# Patient Record
Sex: FEMALE | Race: WHITE
[De-identification: names, ages, dates, MRNs, and addresses within clinical notes are randomized per-mention and may not be internally consistent; named-entity substitution may affect disease eponyms.]

---

## 2017-10-06 ENCOUNTER — HOSPITAL ENCOUNTER (EMERGENCY)
Dept: HOSPITAL 9 - MADERS | Age: 32
Discharge: HOME | End: 2017-10-06
Payer: MEDICAID

## 2017-10-06 DIAGNOSIS — F17.210: ICD-10-CM

## 2017-10-06 DIAGNOSIS — N39.0: Primary | ICD-10-CM

## 2017-10-06 LAB
BACTERIA UR QL AUTO: (no result) HPF
PREGU CONTROL BACKGROUND?: (no result)
PREGU CONTROL BAR APPEAR?: YES
SP GR UR STRIP: 1.02 (ref 1–1.03)

## 2017-10-06 PROCEDURE — 87186 SC STD MICRODIL/AGAR DIL: CPT

## 2017-10-06 PROCEDURE — 87086 URINE CULTURE/COLONY COUNT: CPT

## 2017-10-06 PROCEDURE — 87077 CULTURE AEROBIC IDENTIFY: CPT

## 2017-10-06 PROCEDURE — 99283 EMERGENCY DEPT VISIT LOW MDM: CPT

## 2017-10-06 PROCEDURE — 81025 URINE PREGNANCY TEST: CPT

## 2017-10-06 PROCEDURE — 81001 URINALYSIS AUTO W/SCOPE: CPT

## 2018-05-08 ENCOUNTER — HOSPITAL ENCOUNTER (EMERGENCY)
Dept: HOSPITAL 92 - ERS | Age: 33
Discharge: HOME | End: 2018-05-08
Payer: MEDICAID

## 2018-05-08 DIAGNOSIS — A08.4: Primary | ICD-10-CM

## 2018-05-08 DIAGNOSIS — F17.210: ICD-10-CM

## 2018-05-08 DIAGNOSIS — F41.9: ICD-10-CM

## 2018-05-08 LAB
ALBUMIN SERPL BCG-MCNC: 4.2 G/DL (ref 3.5–5)
ALP SERPL-CCNC: 68 U/L (ref 40–150)
ALT SERPL W P-5'-P-CCNC: 14 U/L (ref 8–55)
ANION GAP SERPL CALC-SCNC: 11 MMOL/L (ref 10–20)
AST SERPL-CCNC: 16 U/L (ref 5–34)
BASOPHILS # BLD AUTO: 0.1 THOU/UL (ref 0–0.2)
BASOPHILS NFR BLD AUTO: 0.7 % (ref 0–1)
BILIRUB SERPL-MCNC: 0.9 MG/DL (ref 0.2–1.2)
BUN SERPL-MCNC: 12 MG/DL (ref 7–18.7)
CALCIUM SERPL-MCNC: 9 MG/DL (ref 7.8–10.44)
CHLORIDE SERPL-SCNC: 102 MMOL/L (ref 98–107)
CO2 SERPL-SCNC: 26 MMOL/L (ref 22–29)
CREAT CL PREDICTED SERPL C-G-VRATE: 0 ML/MIN (ref 70–130)
EOSINOPHIL # BLD AUTO: 0 THOU/UL (ref 0–0.7)
EOSINOPHIL NFR BLD AUTO: 0.5 % (ref 0–10)
GLOBULIN SER CALC-MCNC: 3.5 G/DL (ref 2.4–3.5)
GLUCOSE SERPL-MCNC: 92 MG/DL (ref 70–105)
HGB BLD-MCNC: 14.2 G/DL (ref 12–16)
LIPASE SERPL-CCNC: 8 U/L (ref 8–78)
LYMPHOCYTES # BLD: 2.5 THOU/UL (ref 1.2–3.4)
LYMPHOCYTES NFR BLD AUTO: 28.4 % (ref 21–51)
MCH RBC QN AUTO: 34.9 PG (ref 27–31)
MCV RBC AUTO: 101 FL (ref 81–99)
MONOCYTES # BLD AUTO: 0.5 THOU/UL (ref 0.11–0.59)
MONOCYTES NFR BLD AUTO: 5.4 % (ref 0–10)
NEUTROPHILS # BLD AUTO: 5.6 THOU/UL (ref 1.4–6.5)
NEUTROPHILS NFR BLD AUTO: 65.1 % (ref 42–75)
PLATELET # BLD AUTO: 190 THOU/UL (ref 130–400)
POTASSIUM SERPL-SCNC: 3.7 MMOL/L (ref 3.5–5.1)
PREGU CONTROL BACKGROUND?: (no result)
PREGU CONTROL BAR APPEAR?: YES
RBC # BLD AUTO: 4.05 MILL/UL (ref 4.2–5.4)
SODIUM SERPL-SCNC: 135 MMOL/L (ref 136–145)
SP GR UR STRIP: 1.01 (ref 1–1.04)
WBC # BLD AUTO: 8.6 THOU/UL (ref 4.8–10.8)

## 2018-05-08 PROCEDURE — 96372 THER/PROPH/DIAG INJ SC/IM: CPT

## 2018-05-08 PROCEDURE — 96360 HYDRATION IV INFUSION INIT: CPT

## 2018-05-08 PROCEDURE — 81003 URINALYSIS AUTO W/O SCOPE: CPT

## 2018-05-08 PROCEDURE — 87660 TRICHOMONAS VAGIN DIR PROBE: CPT

## 2018-05-08 PROCEDURE — 80053 COMPREHEN METABOLIC PANEL: CPT

## 2018-05-08 PROCEDURE — 81025 URINE PREGNANCY TEST: CPT

## 2018-05-08 PROCEDURE — 83690 ASSAY OF LIPASE: CPT

## 2018-05-08 PROCEDURE — 99406 BEHAV CHNG SMOKING 3-10 MIN: CPT

## 2018-05-08 PROCEDURE — 85025 COMPLETE CBC W/AUTO DIFF WBC: CPT

## 2018-05-08 PROCEDURE — 87491 CHLMYD TRACH DNA AMP PROBE: CPT

## 2018-05-08 PROCEDURE — 36415 COLL VENOUS BLD VENIPUNCTURE: CPT

## 2018-05-08 PROCEDURE — 87480 CANDIDA DNA DIR PROBE: CPT

## 2018-05-08 PROCEDURE — 87510 GARDNER VAG DNA DIR PROBE: CPT

## 2018-05-08 PROCEDURE — 87591 N.GONORRHOEAE DNA AMP PROB: CPT

## 2019-04-24 ENCOUNTER — HOSPITAL ENCOUNTER (OUTPATIENT)
Dept: HOSPITAL 92 - BICMAMMO | Age: 34
Discharge: HOME | End: 2019-04-24
Payer: MEDICAID

## 2019-04-24 DIAGNOSIS — Z80.3: ICD-10-CM

## 2019-04-24 DIAGNOSIS — N64.4: Primary | ICD-10-CM

## 2019-04-24 DIAGNOSIS — N63.11: ICD-10-CM

## 2019-04-24 PROCEDURE — G0279 TOMOSYNTHESIS, MAMMO: HCPCS

## 2019-04-24 PROCEDURE — 77066 DX MAMMO INCL CAD BI: CPT

## 2019-04-24 NOTE — ULT
LIMITED RIGHT BREAST ULTRASOUND

LIMITED LEFT BREAST ULTRASOUND:

 

History: Palpable abnormalities in the breast bilaterally. 

 

FINDINGS: 

Sonographic evaluation of the region of palpable concern at the 10 o'clock position of the right donita
st and the 12 o'clock position of the left breast demonstrates no abnormality. 

 

IMPRESSION: 

BIRADS category 2 - benign findings. Return to appropriate mammographic screening based on risk facto
rs. 

 

POS: OFF

## 2019-04-24 NOTE — MMO
Bilateral MAMMO Bilat Diag [facility].

 

CLINICAL HISTORY:

Patient is 33 years old and is seen for diagnostic exam and palpable abnormality

in both breasts. The patient has the following family history of breast cancer:

maternal grandmother.  The patient has no personal history of cancer.

 

VIEWS:

The views performed were:  bilateral craniocaudal with tomosynthesis; bilateral

mediolateral oblique with tomosynthesis; and bilateral mediolateral.

 

FILMS COMPARED:

The present examination has been compared to a prior imaging study performed at

[facility] on 04/24/2019.

 

MAMMOGRAM FINDINGS:

There are scattered fibroglandular densities.

 

There are no suspicious masses, calcifications or areas of architectural

distortion.  No mammographic or sonograhic abnormality is seen at the site of

palpable concern in the either breast.

 

There are no suspicious masses, suspicious calcifications, or new areas of

architectural distortion.

 

IMPRESSION:

THERE IS NO MAMMOGRAPHIC EVIDENCE OF MALIGNANCY.

 

AGE APPROPRIATE SCREENING BASED ON RISK FACTORS IS RECOMMENDED.

 

THE RESULTS OF THIS EXAM WERE SENT TO THE PATIENT.

 

ACR BI-RADS Category 2 - Benign finding

 

MAMMOGRAPHY NOTE:

 1. A negative mammogram report should not delay a biopsy if a dominant of

 clinically suspicious mass is present.

 2. Approximately 10% to 15% of breast cancers are not detected by

 mammography.

 3. Adenosis and dense breasts may obscure an underlying neoplasm.

## 2019-08-05 ENCOUNTER — HOSPITAL ENCOUNTER (EMERGENCY)
Dept: HOSPITAL 9 - MADERS | Age: 34
Discharge: HOME | End: 2019-08-05
Payer: MEDICAID

## 2019-08-05 DIAGNOSIS — F41.9: ICD-10-CM

## 2019-08-05 DIAGNOSIS — F17.210: ICD-10-CM

## 2019-08-05 DIAGNOSIS — N30.90: Primary | ICD-10-CM

## 2019-08-05 DIAGNOSIS — N83.202: ICD-10-CM

## 2019-08-05 DIAGNOSIS — R11.0: ICD-10-CM

## 2019-08-05 LAB
BACTERIA UR QL AUTO: (no result) HPF
PREGU CONTROL BACKGROUND?: (no result)
PREGU CONTROL BAR APPEAR?: YES
RBC UR QL AUTO: (no result) HPF (ref 0–3)
WBC UR QL AUTO: (no result) HPF (ref 0–3)

## 2019-08-05 PROCEDURE — 96372 THER/PROPH/DIAG INJ SC/IM: CPT

## 2019-08-05 PROCEDURE — 87591 N.GONORRHOEAE DNA AMP PROB: CPT

## 2019-08-05 PROCEDURE — 81025 URINE PREGNANCY TEST: CPT

## 2019-08-05 PROCEDURE — 81015 MICROSCOPIC EXAM OF URINE: CPT

## 2019-08-05 PROCEDURE — 87480 CANDIDA DNA DIR PROBE: CPT

## 2019-08-05 PROCEDURE — 76856 US EXAM PELVIC COMPLETE: CPT

## 2019-08-05 PROCEDURE — 87510 GARDNER VAG DNA DIR PROBE: CPT

## 2019-08-05 PROCEDURE — 87491 CHLMYD TRACH DNA AMP PROBE: CPT

## 2019-08-05 PROCEDURE — 87186 SC STD MICRODIL/AGAR DIL: CPT

## 2019-08-05 PROCEDURE — 81003 URINALYSIS AUTO W/O SCOPE: CPT

## 2019-08-05 PROCEDURE — 87086 URINE CULTURE/COLONY COUNT: CPT

## 2019-08-05 PROCEDURE — 87077 CULTURE AEROBIC IDENTIFY: CPT

## 2019-08-05 PROCEDURE — 87660 TRICHOMONAS VAGIN DIR PROBE: CPT

## 2019-08-05 NOTE — ULT
Exam: Transabdominal and endovaginal pelvic ultrasound



HISTORY:Right adnexal tenderness.



COMPARISON: 2/27/2013



TECHNIQUE: Transabdominal and endovaginal imaging of the pelvis is performed. Ovaries are interrogate
d with grayscale, color flow, Doppler imaging and spectral wave form analysis



FINDINGS:

Uterus: No myometrial masses.



Uterus measuring: 10.0 x 4.2 x 5.6 cm.

Endometrium: Homogeneous echotexture.

Endometrium diameter: 0.5 cm.

.

Free fluid: None

Left ovary: Normal echotexture. Multi septated anechoic focus, likely representing a 1.4 x 1.2 cm cys
t

Left ovary measurements: 2.1 x 1.3 x 2.6 cm

Right ovary: Normal echotexture; follicles are noted.

Right ovary measurement: 2.5 x 1.7 x 1.6 cm

Ovarian Doppler:

There is vascular flow to the left and right ovary.



IMPRESSION: 

1. Unremarkable uterus

2. Multiseptated left ovarian cyst.

3. Unremarkable right adnexa.



Reported By: Lotus Jordan 

Electronically Signed:  8/5/2019 11:04 AM

## 2020-07-11 NOTE — CT
CT ABDOMEN AND PELVIS:

 

Date:  07/10/2020

 

COMPARISON:  

None. 

 

HISTORY:  

Diarrhea, nausea, flank pain, and suprapubic pain. 

 

TECHNIQUE:  

Axial CT imaging at 5 mm intervals through the abdomen and pelvis with IV and oral contrast. Coronal 
and sagittal reformatted imaging obtained. 

 

FINDINGS:

The imaged lung bases are unremarkable. No free intraperitoneal air.

 

The liver, gallbladder, spleen, pancreas, adrenal glands, and kidneys demonstrate no acute findings. 


 

There is no significant free pelvic fluid. 

 

There is no focal area of bowel inflammatory change. No evidence for bowel obstruction is noted. 

 

The appendix is not discretely visualized, but no right lower quadrant inflammatory change is seen to
 suggest the presence of acute appendicitis. 

 

The vascular structures of the abdomen/pelvis appear patent. No abdominal or pelvic lymphadenopathy i
s noted. No acute osseous abnormality. 

 

IMPRESSION: 

No acute findings.  

 

POS: [facility]

## 2021-05-23 ENCOUNTER — HOSPITAL ENCOUNTER (EMERGENCY)
Dept: HOSPITAL 9 - MADERS | Age: 36
Discharge: HOME | End: 2021-05-23
Payer: MEDICAID

## 2021-05-23 DIAGNOSIS — U07.1: Primary | ICD-10-CM

## 2021-05-23 DIAGNOSIS — J02.0: ICD-10-CM

## 2021-05-23 DIAGNOSIS — E86.0: ICD-10-CM

## 2021-05-23 DIAGNOSIS — R11.2: ICD-10-CM

## 2021-05-23 DIAGNOSIS — F17.210: ICD-10-CM

## 2021-05-23 LAB
ALBUMIN SERPL BCG-MCNC: 4.2 G/DL (ref 3.5–5)
ALP SERPL-CCNC: 67 U/L (ref 40–110)
ALT SERPL W P-5'-P-CCNC: 13 U/L (ref 8–55)
ANION GAP SERPL CALC-SCNC: 16 MMOL/L (ref 10–20)
AST SERPL-CCNC: 10 U/L (ref 5–34)
BILIRUB SERPL-MCNC: 0.7 MG/DL (ref 0.2–1.2)
BUN SERPL-MCNC: 9 MG/DL (ref 7–18.7)
CALCIUM SERPL-MCNC: 9 MG/DL (ref 7.8–10.44)
CHLORIDE SERPL-SCNC: 102 MMOL/L (ref 98–107)
CO2 SERPL-SCNC: 26 MMOL/L (ref 22–29)
CREAT CL PREDICTED SERPL C-G-VRATE: 0 ML/MIN (ref 70–130)
GLOBULIN SER CALC-MCNC: 3.5 G/DL (ref 2.4–3.5)
GLUCOSE SERPL-MCNC: 92 MG/DL (ref 70–105)
HGB BLD-MCNC: 13.9 G/DL (ref 12–16)
MCH RBC QN AUTO: 32.9 PG (ref 27–31)
MCV RBC AUTO: 98.5 FL (ref 78–98)
MDIFF COMPLETE?: YES
PLATELET # BLD AUTO: 245 THOU/UL (ref 130–400)
POTASSIUM SERPL-SCNC: 3.9 MMOL/L (ref 3.5–5.1)
RBC # BLD AUTO: 4.22 MILL/UL (ref 4.2–5.4)
SODIUM SERPL-SCNC: 140 MMOL/L (ref 136–145)
WBC # BLD AUTO: 14.3 THOU/UL (ref 4.8–10.8)

## 2021-05-23 PROCEDURE — 85025 COMPLETE CBC W/AUTO DIFF WBC: CPT

## 2021-05-23 PROCEDURE — 96365 THER/PROPH/DIAG IV INF INIT: CPT

## 2021-05-23 PROCEDURE — U0005 INFEC AGEN DETEC AMPLI PROBE: HCPCS

## 2021-05-23 PROCEDURE — 96375 TX/PRO/DX INJ NEW DRUG ADDON: CPT

## 2021-05-23 PROCEDURE — 87430 STREP A AG IA: CPT

## 2021-05-23 PROCEDURE — U0003 INFECTIOUS AGENT DETECTION BY NUCLEIC ACID (DNA OR RNA); SEVERE ACUTE RESPIRATORY SYNDROME CORONAVIRUS 2 (SARS-COV-2) (CORONAVIRUS DISEASE [COVID-19]), AMPLIFIED PROBE TECHNIQUE, MAKING USE OF HIGH THROUGHPUT TECHNOLOGIES AS DESCRIBED BY CMS-2020-01-R: HCPCS

## 2021-05-23 PROCEDURE — 80053 COMPREHEN METABOLIC PANEL: CPT

## 2021-05-24 LAB — SARS-COV-2 RNA RESP QL NAA+PROBE: DETECTED

## 2021-10-15 ENCOUNTER — HOSPITAL ENCOUNTER (EMERGENCY)
Dept: HOSPITAL 9 - MADERS | Age: 36
Discharge: HOME | End: 2021-10-15
Payer: MEDICAID

## 2021-10-15 DIAGNOSIS — J06.9: Primary | ICD-10-CM

## 2021-10-15 DIAGNOSIS — Z20.822: ICD-10-CM

## 2021-10-15 DIAGNOSIS — F17.210: ICD-10-CM

## 2021-10-15 PROCEDURE — U0005 INFEC AGEN DETEC AMPLI PROBE: HCPCS

## 2021-10-15 PROCEDURE — 99283 EMERGENCY DEPT VISIT LOW MDM: CPT

## 2021-10-15 PROCEDURE — 87081 CULTURE SCREEN ONLY: CPT

## 2021-10-15 PROCEDURE — 96372 THER/PROPH/DIAG INJ SC/IM: CPT

## 2021-10-15 PROCEDURE — 87804 INFLUENZA ASSAY W/OPTIC: CPT

## 2021-10-15 PROCEDURE — 87430 STREP A AG IA: CPT

## 2021-10-15 PROCEDURE — U0003 INFECTIOUS AGENT DETECTION BY NUCLEIC ACID (DNA OR RNA); SEVERE ACUTE RESPIRATORY SYNDROME CORONAVIRUS 2 (SARS-COV-2) (CORONAVIRUS DISEASE [COVID-19]), AMPLIFIED PROBE TECHNIQUE, MAKING USE OF HIGH THROUGHPUT TECHNOLOGIES AS DESCRIBED BY CMS-2020-01-R: HCPCS

## 2022-01-04 ENCOUNTER — HOSPITAL ENCOUNTER (EMERGENCY)
Dept: HOSPITAL 9 - MADERS | Age: 37
Discharge: HOME | End: 2022-01-04
Payer: SELF-PAY

## 2022-01-04 DIAGNOSIS — Z20.822: ICD-10-CM

## 2022-01-04 DIAGNOSIS — N76.0: Primary | ICD-10-CM

## 2022-01-04 DIAGNOSIS — F17.210: ICD-10-CM

## 2022-01-04 DIAGNOSIS — J06.9: ICD-10-CM

## 2022-01-04 LAB
BACTERIA UR QL AUTO: (no result) HPF
MUCOUS THREADS UR QL AUTO: (no result) LPF
PREGU CONTROL BACKGROUND?: (no result)
PREGU CONTROL BAR APPEAR?: YES
PROT UR STRIP.AUTO-MCNC: 100 MG/DL
SP GR UR STRIP: 1.02 (ref 1–1.03)
WBC UR QL AUTO: (no result) HPF (ref 0–3)

## 2022-01-04 PROCEDURE — 87591 N.GONORRHOEAE DNA AMP PROB: CPT

## 2022-01-04 PROCEDURE — 81003 URINALYSIS AUTO W/O SCOPE: CPT

## 2022-01-04 PROCEDURE — U0005 INFEC AGEN DETEC AMPLI PROBE: HCPCS

## 2022-01-04 PROCEDURE — 99284 EMERGENCY DEPT VISIT MOD MDM: CPT

## 2022-01-04 PROCEDURE — U0003 INFECTIOUS AGENT DETECTION BY NUCLEIC ACID (DNA OR RNA); SEVERE ACUTE RESPIRATORY SYNDROME CORONAVIRUS 2 (SARS-COV-2) (CORONAVIRUS DISEASE [COVID-19]), AMPLIFIED PROBE TECHNIQUE, MAKING USE OF HIGH THROUGHPUT TECHNOLOGIES AS DESCRIBED BY CMS-2020-01-R: HCPCS

## 2022-01-04 PROCEDURE — 96372 THER/PROPH/DIAG INJ SC/IM: CPT

## 2022-01-04 PROCEDURE — 81025 URINE PREGNANCY TEST: CPT

## 2022-01-04 PROCEDURE — 87491 CHLMYD TRACH DNA AMP PROBE: CPT

## 2022-01-04 PROCEDURE — 81015 MICROSCOPIC EXAM OF URINE: CPT

## 2022-06-01 ENCOUNTER — HOSPITAL ENCOUNTER (EMERGENCY)
Dept: HOSPITAL 9 - MADERS | Age: 37
Discharge: HOME | End: 2022-06-01
Payer: SELF-PAY

## 2022-06-01 DIAGNOSIS — N39.0: Primary | ICD-10-CM

## 2022-06-01 DIAGNOSIS — F17.210: ICD-10-CM

## 2022-06-01 LAB
BACTERIA UR QL AUTO: (no result) HPF
RBC UR QL AUTO: (no result) HPF (ref 0–3)
SP GR UR STRIP: 1.02 (ref 1–1.03)

## 2022-06-01 PROCEDURE — 81003 URINALYSIS AUTO W/O SCOPE: CPT

## 2022-06-01 PROCEDURE — 81015 MICROSCOPIC EXAM OF URINE: CPT

## 2022-06-01 PROCEDURE — 99283 EMERGENCY DEPT VISIT LOW MDM: CPT

## 2022-10-24 ENCOUNTER — HOSPITAL ENCOUNTER (EMERGENCY)
Dept: HOSPITAL 9 - MADERS | Age: 37
Discharge: HOME | End: 2022-10-24
Payer: SELF-PAY

## 2022-10-24 DIAGNOSIS — F17.210: ICD-10-CM

## 2022-10-24 DIAGNOSIS — R10.9: ICD-10-CM

## 2022-10-24 DIAGNOSIS — R07.2: Primary | ICD-10-CM

## 2022-10-24 LAB
ALBUMIN SERPL BCG-MCNC: 4.2 G/DL (ref 3.5–5)
ALP SERPL-CCNC: 60 U/L (ref 40–110)
ALT SERPL W P-5'-P-CCNC: 12 U/L (ref 8–55)
ANION GAP SERPL CALC-SCNC: 11 MMOL/L (ref 10–20)
AST SERPL-CCNC: 13 U/L (ref 5–34)
BACTERIA UR QL AUTO: (no result) HPF
BASOPHILS # BLD AUTO: 0.1 THOU/UL (ref 0–0.2)
BASOPHILS NFR BLD AUTO: 1.1 % (ref 0–1)
BILIRUB SERPL-MCNC: 0.3 MG/DL (ref 0.2–1.2)
BUN SERPL-MCNC: 7 MG/DL (ref 7–18.7)
CALCIUM SERPL-MCNC: 9.1 MG/DL (ref 7.8–10.44)
CHLORIDE SERPL-SCNC: 107 MMOL/L (ref 98–107)
CO2 SERPL-SCNC: 28 MMOL/L (ref 22–29)
CREAT CL PREDICTED SERPL C-G-VRATE: 0 ML/MIN (ref 70–130)
EOSINOPHIL # BLD AUTO: 0.1 THOU/UL (ref 0–0.7)
EOSINOPHIL NFR BLD AUTO: 1.4 % (ref 0–10)
GLOBULIN SER CALC-MCNC: 3.4 G/DL (ref 2.4–3.5)
GLUCOSE SERPL-MCNC: 75 MG/DL (ref 70–105)
HGB BLD-MCNC: 13.5 G/DL (ref 12–16)
LIPASE SERPL-CCNC: 19 U/L (ref 8–78)
LYMPHOCYTES # BLD AUTO: 2.8 THOU/UL (ref 1.2–3.4)
LYMPHOCYTES NFR BLD AUTO: 43.6 % (ref 21–51)
MCH RBC QN AUTO: 32.9 PG (ref 27–31)
MCV RBC AUTO: 100.2 FL (ref 78–98)
MONOCYTES # BLD AUTO: 0.3 THOU/UL (ref 0.11–0.59)
MONOCYTES NFR BLD AUTO: 5.1 % (ref 0–10)
NEUTROPHILS # BLD AUTO: 3.2 THOU/UL (ref 1.4–6.5)
NEUTROPHILS NFR BLD AUTO: 48.7 % (ref 42–75)
PLATELET # BLD AUTO: 274 THOU/UL (ref 130–400)
POTASSIUM SERPL-SCNC: 4 MMOL/L (ref 3.5–5.1)
PREGU CONTROL BACKGROUND?: (no result)
PREGU CONTROL BAR APPEAR?: YES
RBC # BLD AUTO: 4.1 MILL/UL (ref 4.2–5.4)
RBC UR QL AUTO: (no result) HPF (ref 0–3)
SODIUM SERPL-SCNC: 142 MMOL/L (ref 136–145)
SP GR UR STRIP: 1.02 (ref 1–1.03)
WBC # BLD AUTO: 6.5 THOU/UL (ref 4.8–10.8)
WBC UR QL AUTO: (no result) HPF (ref 0–3)

## 2022-10-24 PROCEDURE — 94760 N-INVAS EAR/PLS OXIMETRY 1: CPT

## 2022-10-24 PROCEDURE — 85379 FIBRIN DEGRADATION QUANT: CPT

## 2022-10-24 PROCEDURE — 81025 URINE PREGNANCY TEST: CPT

## 2022-10-24 PROCEDURE — 81003 URINALYSIS AUTO W/O SCOPE: CPT

## 2022-10-24 PROCEDURE — 71045 X-RAY EXAM CHEST 1 VIEW: CPT

## 2022-10-24 PROCEDURE — 83690 ASSAY OF LIPASE: CPT

## 2022-10-24 PROCEDURE — 85025 COMPLETE CBC W/AUTO DIFF WBC: CPT

## 2022-10-24 PROCEDURE — 80053 COMPREHEN METABOLIC PANEL: CPT

## 2022-10-24 PROCEDURE — 84484 ASSAY OF TROPONIN QUANT: CPT

## 2022-10-24 PROCEDURE — 36415 COLL VENOUS BLD VENIPUNCTURE: CPT

## 2022-10-24 PROCEDURE — 76705 ECHO EXAM OF ABDOMEN: CPT

## 2022-10-24 PROCEDURE — 93005 ELECTROCARDIOGRAM TRACING: CPT

## 2022-10-24 PROCEDURE — 83605 ASSAY OF LACTIC ACID: CPT

## 2022-10-24 PROCEDURE — 81015 MICROSCOPIC EXAM OF URINE: CPT

## 2023-04-05 ENCOUNTER — HOSPITAL ENCOUNTER (EMERGENCY)
Dept: HOSPITAL 92 - ERS | Age: 38
Discharge: HOME | End: 2023-04-05
Payer: SELF-PAY

## 2023-04-05 DIAGNOSIS — M79.662: Primary | ICD-10-CM

## 2023-04-05 DIAGNOSIS — M79.661: ICD-10-CM

## 2023-04-05 LAB
ALBUMIN SERPL BCG-MCNC: 3.7 G/DL (ref 3.5–5)
ALP SERPL-CCNC: 68 U/L (ref 40–110)
ALT SERPL W P-5'-P-CCNC: 21 U/L (ref 8–55)
ANION GAP SERPL CALC-SCNC: 10 MMOL/L (ref 10–20)
AST SERPL-CCNC: 17 U/L (ref 5–34)
BASOPHILS # BLD AUTO: 0 THOU/UL (ref 0–0.2)
BASOPHILS NFR BLD AUTO: 0.3 % (ref 0–1)
BILIRUB SERPL-MCNC: 0.3 MG/DL (ref 0.2–1.2)
BUN SERPL-MCNC: 7 MG/DL (ref 7–18.7)
CALCIUM SERPL-MCNC: 8.8 MG/DL (ref 7.8–10.44)
CHLORIDE SERPL-SCNC: 105 MMOL/L (ref 98–107)
CO2 SERPL-SCNC: 25 MMOL/L (ref 22–29)
CREAT CL PREDICTED SERPL C-G-VRATE: 0 ML/MIN (ref 70–130)
EOSINOPHIL # BLD AUTO: 0.3 THOU/UL (ref 0–0.7)
EOSINOPHIL NFR BLD AUTO: 4.1 % (ref 0–10)
GLOBULIN SER CALC-MCNC: 3.6 G/DL (ref 2.4–3.5)
GLUCOSE SERPL-MCNC: 93 MG/DL (ref 70–105)
HGB BLD-MCNC: 12.4 G/DL (ref 12–16)
LYMPHOCYTES # BLD: 1.4 THOU/UL (ref 1.2–3.4)
LYMPHOCYTES NFR BLD AUTO: 21.3 % (ref 21–51)
MCH RBC QN AUTO: 32.6 PG (ref 27–31)
MCV RBC AUTO: 101 FL (ref 78–98)
MONOCYTES # BLD AUTO: 0.6 THOU/UL (ref 0.11–0.59)
MONOCYTES NFR BLD AUTO: 8.7 % (ref 0–10)
NEUTROPHILS # BLD AUTO: 4.2 THOU/UL (ref 1.4–6.5)
NEUTROPHILS NFR BLD AUTO: 65.6 % (ref 42–75)
PLATELET # BLD AUTO: 361 10X3/UL (ref 130–400)
POTASSIUM SERPL-SCNC: 3.7 MMOL/L (ref 3.5–5.1)
PREGS CONTROL BACKGROUND?: (no result)
PREGS CONTROL BAR APPEAR?: YES
RBC # BLD AUTO: 3.81 MILL/UL (ref 4.2–5.4)
SODIUM SERPL-SCNC: 136 MMOL/L (ref 136–145)
WBC # BLD AUTO: 6.5 10X3/UL (ref 4.8–10.8)

## 2023-04-05 PROCEDURE — 85652 RBC SED RATE AUTOMATED: CPT

## 2023-04-05 PROCEDURE — 86140 C-REACTIVE PROTEIN: CPT

## 2023-04-05 PROCEDURE — 84703 CHORIONIC GONADOTROPIN ASSAY: CPT

## 2023-04-05 PROCEDURE — 36415 COLL VENOUS BLD VENIPUNCTURE: CPT

## 2023-04-05 PROCEDURE — 80053 COMPREHEN METABOLIC PANEL: CPT

## 2023-04-05 PROCEDURE — 85025 COMPLETE CBC W/AUTO DIFF WBC: CPT
